# Patient Record
Sex: MALE | Race: OTHER | Employment: UNEMPLOYED | URBAN - METROPOLITAN AREA
[De-identification: names, ages, dates, MRNs, and addresses within clinical notes are randomized per-mention and may not be internally consistent; named-entity substitution may affect disease eponyms.]

---

## 2018-01-12 PROBLEM — I10 HYPERTENSION, UNSPECIFIED TYPE: Status: ACTIVE | Noted: 2018-01-12

## 2018-12-07 ENCOUNTER — HOSPITAL ENCOUNTER (INPATIENT)
Facility: HOSPITAL | Age: 43
LOS: 1 days | Discharge: HOME | DRG: 069 | End: 2018-12-08
Attending: INTERNAL MEDICINE | Admitting: INTERNAL MEDICINE
Payer: COMMERCIAL

## 2018-12-07 DIAGNOSIS — I10 HYPERTENSION, UNSPECIFIED TYPE: Primary | ICD-10-CM

## 2018-12-07 PROCEDURE — 82962 GLUCOSE BLOOD TEST: CPT

## 2018-12-07 RX ORDER — LOSARTAN POTASSIUM 50 MG/1
50 TABLET ORAL DAILY
Status: DISCONTINUED | OUTPATIENT
Start: 2018-12-08 | End: 2018-12-08

## 2018-12-07 RX ORDER — BUTALBITAL, ACETAMINOPHEN AND CAFFEINE 50; 325; 40 MG/1; MG/1; MG/1
1 TABLET ORAL EVERY 4 HOURS PRN
Status: DISCONTINUED | OUTPATIENT
Start: 2018-12-07 | End: 2018-12-08

## 2018-12-08 ENCOUNTER — APPOINTMENT (OUTPATIENT)
Dept: MRI IMAGING | Facility: HOSPITAL | Age: 43
DRG: 069 | End: 2018-12-08
Attending: Other
Payer: COMMERCIAL

## 2018-12-08 ENCOUNTER — APPOINTMENT (OUTPATIENT)
Dept: CV DIAGNOSTICS | Facility: HOSPITAL | Age: 43
DRG: 069 | End: 2018-12-08
Attending: INTERNAL MEDICINE
Payer: COMMERCIAL

## 2018-12-08 VITALS
WEIGHT: 262.31 LBS | TEMPERATURE: 98 F | BODY MASS INDEX: 42.16 KG/M2 | DIASTOLIC BLOOD PRESSURE: 68 MMHG | OXYGEN SATURATION: 96 % | HEART RATE: 63 BPM | RESPIRATION RATE: 20 BRPM | SYSTOLIC BLOOD PRESSURE: 135 MMHG | HEIGHT: 66 IN

## 2018-12-08 PROCEDURE — 70551 MRI BRAIN STEM W/O DYE: CPT | Performed by: OTHER

## 2018-12-08 PROCEDURE — 93306 TTE W/DOPPLER COMPLETE: CPT | Performed by: INTERNAL MEDICINE

## 2018-12-08 PROCEDURE — 82962 GLUCOSE BLOOD TEST: CPT

## 2018-12-08 RX ORDER — ATORVASTATIN CALCIUM 40 MG/1
40 TABLET, FILM COATED ORAL NIGHTLY
Qty: 30 TABLET | Refills: 3 | Status: SHIPPED | OUTPATIENT
Start: 2018-12-08 | End: 2019-07-02

## 2018-12-08 RX ORDER — ATORVASTATIN CALCIUM 40 MG/1
40 TABLET, FILM COATED ORAL NIGHTLY
Status: DISCONTINUED | OUTPATIENT
Start: 2018-12-08 | End: 2018-12-08

## 2018-12-08 RX ORDER — LOSARTAN POTASSIUM 100 MG/1
100 TABLET ORAL DAILY
Qty: 30 TABLET | Refills: 3 | Status: SHIPPED | OUTPATIENT
Start: 2018-12-08 | End: 2019-07-02

## 2018-12-08 RX ORDER — POTASSIUM CHLORIDE 20 MEQ/1
40 TABLET, EXTENDED RELEASE ORAL EVERY 4 HOURS
Status: COMPLETED | OUTPATIENT
Start: 2018-12-08 | End: 2018-12-08

## 2018-12-08 RX ORDER — ASPIRIN 81 MG/1
81 TABLET, CHEWABLE ORAL DAILY
Qty: 30 TABLET | Refills: 3 | Status: SHIPPED | OUTPATIENT
Start: 2018-12-09 | End: 2020-12-01

## 2018-12-08 RX ORDER — ACETAMINOPHEN 325 MG/1
650 TABLET ORAL EVERY 6 HOURS PRN
Status: DISCONTINUED | OUTPATIENT
Start: 2018-12-08 | End: 2018-12-08

## 2018-12-08 RX ORDER — ASPIRIN 81 MG/1
81 TABLET, CHEWABLE ORAL DAILY
Status: DISCONTINUED | OUTPATIENT
Start: 2018-12-08 | End: 2018-12-08

## 2018-12-08 NOTE — PLAN OF CARE
NURSING DISCHARGE NOTE    Discharged Home via Ambulatory. Accompanied by Family member  Belongings Taken by patient/family.     Per Dr. Chriss Melendez, patient can have Carotid US OP and follow up with her NP in 4-6 weeks  Stroke education provided      [facility]

## 2018-12-08 NOTE — PLAN OF CARE
NEUROLOGICAL - ADULT    • Achieves stable or improved neurological status Adequate for Discharge    • Achieves maximal functionality and self care Adequate for Discharge        Patient/Family Goals    • Patient/Family Long Term Goal Adequate for Discha

## 2018-12-08 NOTE — H&P
[facility] Hospitalist H&P       CC: No chief complaint on file. PCP: Woody Yates MD    History of Present Illness:  37y/o M with hx of HTN, HLD, obesity who presented with new onset paresthesias.  Onset was Thursday (2d/a) at about 6:45PM, involved fac BP: 148/82 139/73  (!) 168/94   BP Location: Right arm Right arm     Pulse: 55 61 61 64   Resp: 22 20     Temp: 97.7 °F (36.5 °C) 98.3 °F (36.8 °C)     TempSrc: Oral Oral     SpO2: 93% 96%  97%   Weight:       Height:           Wt Readings from Last 6 En posterior putaminal and external capsule. These range in size from 9 mm down to approximately 1.9 mm with the largest lesion in the right posterior aspect of the caudate head.   Of interest is that 2 of the larger lesions are associated with significant ma patient's medical history and medications. Time spent: greater than 45 minutes spent in d/w pt/family, coordination of care, and d/w staff. Farheen Arvizu MD   [facility] Hospitalist  Pager: [phone]    Addendum:     Neuro note reviewed.    Echo r

## 2018-12-08 NOTE — CONSULTS
[facility]    PATIENT'S NAME: [email or web address], [place]   ATTENDING PHYSICIAN: Gray Coronel MD   CONSULTING PHYSICIAN: Jagdish Grey M.D.    PATIENT ACCOUNT#:   [de-identified]    LOCATION:  [address] [place] [address]  MEDICAL RECORD #:   [id number]       DATE OF BIRTH patient is in no apparent distress. He has obese body habitus. VITAL SIGNS:  He is afebrile. Blood pressure is currently 170/94, pulse of 64. LUNGS:  Clear. HEART:  Regular rate and rhythm. ABDOMEN:  Soft.     EXTREMITIES:  There are no rashes,

## 2018-12-08 NOTE — PLAN OF CARE
Admitted from [facility], direct transfer. Hs been having multiple episodes of parathesisa's over the past day. MRI done, pending results. No stroke protocol, but neuro checks completed. Will monitor. 0100 neuro check delayed as patient at MRI.

## 2018-12-11 NOTE — PAYOR COMM NOTE
--------------  DISCHARGE REVIEW    Payor: [address] [place] [address] AMINAH/SARATH  Subscriber #:  [id number]  Authorization Number: [id number]    Admit date: 12/7/18  Admit time:  2239  Discharge Date: 12/8/2018  6:46 PM     Admitting Physician: MD Chari Black patient is in no apparent distress. He has obese body habitus. VITAL SIGNS:  He is afebrile. Blood pressure is currently 170/94, pulse of 64. LUNGS:  Clear. HEART:  Regular rate and rhythm. ABDOMEN:  Soft.     EXTREMITIES:  There are no rashes, 12/8/2018 12:51 PM   PROCEDURE:  MRI BRAIN (CPT=70551)     COMPARISON:  None.      INDICATIONS:  R/O CVA     TECHNIQUE:  MRI of the brain was performed with multi-planar T1, T2-weighted images with FLAIR sequences and diffusion weighted images without infus

## 2018-12-11 NOTE — PAYOR COMM NOTE
--------------  ADMISSION REVIEW     Payor: Jennifer BURR/SARATH  Subscriber #:  [id number]  Authorization Number: [id number]    Admit date: 12/7/18  Admit time: 2239       Admitting Physician: Mandy Seals MD  Attending Physician:  Sarah att. providers DAILY Disp: 90 tablet Rfl: 0         Soc Hx  Social History    Tobacco Use      Smoking status: Never Smoker      Smokeless tobacco: Never Used    Alcohol use: No      Alcohol/week: 0.0 oz       Fam Hx  Family History   Problem Relation Age of Onset   • [place] hours. Additional Diagnostics:     Radiology:   Mri Brain (cpt=70551)    Result Date: 12/8/2018  PROCEDURE:  MRI BRAIN (CPT=70551)  COMPARISON:  None.   INDICATIONS:  R/O CVA  TECHNIQUE:  MRI of the brain was performed with multi-planar T1, T2-weighted i HLD, obesity who presented with new onset paresthesias.     # Paresthesias  # Evidence of prior ganglionic lacunar infarcts  - likely in setting of uncontrolled HTN and medication noncompliance  - symptoms now resolved  - check echo  - start statin  - MRI r

## 2021-04-20 PROBLEM — E66.01 OBESITY, MORBID (HCC): Status: ACTIVE | Noted: 2021-04-20

## 2021-04-20 PROBLEM — E11.65 UNCONTROLLED TYPE 2 DIABETES MELLITUS WITH HYPERGLYCEMIA (HCC): Status: ACTIVE | Noted: 2021-04-20
